# Patient Record
Sex: FEMALE | Race: WHITE | URBAN - METROPOLITAN AREA
[De-identification: names, ages, dates, MRNs, and addresses within clinical notes are randomized per-mention and may not be internally consistent; named-entity substitution may affect disease eponyms.]

---

## 2017-01-02 ENCOUNTER — EMERGENCY (EMERGENCY)
Facility: HOSPITAL | Age: 32
LOS: 1 days | Discharge: HOME | End: 2017-01-02
Attending: EMERGENCY MEDICINE | Admitting: EMERGENCY MEDICINE
Payer: COMMERCIAL

## 2017-01-02 VITALS
SYSTOLIC BLOOD PRESSURE: 122 MMHG | OXYGEN SATURATION: 100 % | RESPIRATION RATE: 18 BRPM | TEMPERATURE: 98 F | DIASTOLIC BLOOD PRESSURE: 66 MMHG | HEIGHT: 66 IN | WEIGHT: 125.66 LBS | HEART RATE: 79 BPM

## 2017-01-02 DIAGNOSIS — S81.012A LACERATION WITHOUT FOREIGN BODY, LEFT KNEE, INITIAL ENCOUNTER: ICD-10-CM
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
## 2017-01-02 DIAGNOSIS — Z23 ENCOUNTER FOR IMMUNIZATION: ICD-10-CM
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
## 2017-01-02 PROCEDURE — 90715 TDAP VACCINE 7 YRS/> IM: CPT

## 2017-01-02 PROCEDURE — 99283 EMERGENCY DEPT VISIT LOW MDM: CPT | Mod: 25

## 2017-01-02 PROCEDURE — 90471 IMMUNIZATION ADMIN: CPT

## 2017-01-02 PROCEDURE — 12002 RPR S/N/AX/GEN/TRNK2.6-7.5CM: CPT

## 2017-01-02 RX ORDER — TETANUS TOXOID, REDUCED DIPHTHERIA TOXOID AND ACELLULAR PERTUSSIS VACCINE, ADSORBED 5; 2.5; 8; 8; 2.5 [IU]/.5ML; [IU]/.5ML; UG/.5ML; UG/.5ML; UG/.5ML
0.5 SUSPENSION INTRAMUSCULAR ONCE
Qty: 0 | Refills: 0 | Status: COMPLETED | OUTPATIENT
Start: 2017-01-02 | End: 2017-01-02

## 2017-01-02 RX ADMIN — TETANUS TOXOID, REDUCED DIPHTHERIA TOXOID AND ACELLULAR PERTUSSIS VACCINE, ADSORBED 0.5 MILLILITER(S): 5; 2.5; 8; 8; 2.5 SUSPENSION INTRAMUSCULAR at 21:31

## 2017-01-02 NOTE — ED PROVIDER NOTE - SKIN, MLM
Skin normal color for race, warm, dry and intact. No evidence of rash, superficial 3cm wound to left knee, no fb visuzlied

## 2017-01-02 NOTE — ED ADULT TRIAGE NOTE - CHIEF COMPLAINT QUOTE
"I fell at a museum and hurt my right knee." 1.5cm laceration noted to right knee and bleeding controlled. Last tetanus unknown. No LOC, no hitting head.

## 2017-01-02 NOTE — ED PROVIDER NOTE - MUSCULOSKELETAL, MLM
Spine appears normal, range of motion is not limited, no muscle or joint tenderness, no ttp on palpation to knee, neg pain w/ varus/valgus stress

## 2017-01-02 NOTE — ED PROVIDER NOTE - MEDICAL DECISION MAKING DETAILS
ES 30 yo otherwise healthy f presents to ed for rt knee laceration she sustained after hitting her knee on stair. Pt with no fb on exam full rom intact neg palpable tenderness.  Pt deferring pain medication.  Laceration irrigated and repaired, 6 sutures placed to wound, sterile dressing placed to wound, pt tolerated procedure well.

## 2017-01-02 NOTE — ED PROVIDER NOTE - OBJECTIVE STATEMENT
ES 32 yo otherwise healthy f presents to ed for rt knee laceration she sustained after hitting her knee on stair.  Pt with minimal pain to cut, does not recall when her last tetanus vaccine was.  Pt otherwise denies numbness/tingling, weakness.  Pt denies any other injuries

## 2018-12-05 NOTE — ED ADULT TRIAGE NOTE - AS TEMP SITE
Telephone Encounter by Gladis Louis CMA at 04/30/18 01:43 PM     Author:  Gladis Louis CMA Service:  (none) Author Type:  Certified Medical Assistant     Filed:  04/30/18 02:21 PM Encounter Date:  4/30/2018 Status:  Signed     :  Gladis Louis CMA (Certified Medical Assistant)            PSR please call pt and schedule appt.[CA1.1T]     LOV  11/15/17    Exception refill[CA1.1M]      Revision History        User Key Date/Time User Provider Type Action    > CA1.1 04/30/18 02:21 PM Gladis Louis CMA Certified Medical Assistant Sign    M - Manual, T - Template             oral

## 2019-06-12 NOTE — ED PROCEDURE NOTE - CPROC ED POST PROC CARE GUIDE1
Detail Level: Simple Identifies discoloration in area of itch on R upper back.  Suspicious for morphea.  Advised to begin TMC 0.1% cr bid PRN- will recheck at [facility]. May be a nevus, but recommend recheck and possible shave bx at [facility] at [facility]. Instructed patient/caregiver regarding signs and symptoms of infection./Instructed patient/caregiver to follow-up with primary care physician./Keep the cast/splint/dressing clean and dry./Verbal/written post procedure instructions were given to patient/caregiver. Observe for resolution.